# Patient Record
(demographics unavailable — no encounter records)

---

## 2024-10-21 NOTE — HISTORY OF PRESENT ILLNESS
[8] : 8 [0] : 0 [Stabbing] : stabbing [Intermittent] : intermittent [Leisure] : leisure [Sleep] : sleep [Ice] : ice [Retired] : Work status: retired [de-identified] : Right knee pain started worsening in August. No new injury.  Treated with CSI without relief. She also c/o clicking and locking to her right thumb. Treated with 1 CSI in August without relief.  [] : no [FreeTextEntry1] : Right knee [FreeTextEntry9] : Naproxen [de-identified] : getting up and down from a sitting position, crossing her legs [de-identified] : Dr Moreno and Dr Saavedra [de-identified] : 20 years ago [de-identified] : xray none

## 2024-10-21 NOTE — PROCEDURE
[Large Joint Injection] : Large joint injection [Right] : of the right [Knee] : knee [Pain] : pain [Inflammation] : inflammation [X-ray evidence of Osteoarthritis on this or prior visit] : x-ray evidence of Osteoarthritis on this or prior visit [Alcohol] : alcohol [Betadine] : betadine [Ethyl Chloride sprayed topically] : ethyl chloride sprayed topically [Sterile technique used] : sterile technique used [Gel-One (30mg)] : 30mg of Gel-One [] : Patient tolerated procedure well [Apply ice for 15min out of every hour for the next 12-24 hours as tolerated] : apply ice for 15 minutes out of every hour for the next 12-24 hours as tolerated [Previous OTC use and PT nontherapeutic] : patient has tried OTC's including aspirin, Ibuprofen, Aleve, etc or prescription NSAIDS, and/or exercises at home and/or physical therapy without satisfactory response [Patient had decreased mobility in the joint] : patient had decreased mobility in the joint [Risks, benefits, alternatives discussed / Verbal consent obtained] : the risks benefits, and alternatives have been discussed, and verbal consent was obtained [Prior failure or difficult injection] : prior failure or difficult injection [Altered anatomic landmarks d/t erosive arthritis] : altered anatomic landmarks d/t erosive arthritis [All ultrasound images have been permanently captured and stored accordingly in our picture archiving and communication system] : All ultrasound images have been permanently captured and stored accordingly in our picture archiving and communication system

## 2024-10-21 NOTE — DISCUSSION/SUMMARY
[de-identified] : General Dx Discussion The patient was advised of the diagnosis. The natural history of the pathology was explained in full to the patient in layman's terms. All questions were answered. The risks and benefits of surgical and non-surgical treatment alternatives were explained in full to the patient.  Case Discussed. x-rays reviewed. Advised best treatment option is surgery for TKR. She does not wish to have surgery at this time.  Will try Gel One injection. Done today and tolerated well.  Advised to see Dr. Bear/Dr. Barrera for further discussion of right trigger finger release.     Entered by Blanche TSE acting as a scribe. Instructions: Dr. Saavedra- The documentation recorded by the scribe accurately reflects the service I personally performed and the decisions made by me.

## 2024-10-21 NOTE — PHYSICAL EXAM
[A1-Pulley] : A1-pulley [1st] : 1st [Right] : right knee [NL (0)] : extension 0 degrees [5___] : hamstring 5[unfilled]/5 [Negative] : negative Francoise's [] : no pain with varus stress [TWNoteComboBox7] : flexion 110 degrees

## 2024-10-25 NOTE — ASSESSMENT
[FreeTextEntry1] : The patient was advised of the diagnosis. The natural history of the pathology was explained in full to the patient in layman's terms. All questions were answered. The risks and benefits of surgical and non-surgical treatment alternatives were explained in full to the patient.   R thumb tendon sheath injection was performed because of pain and inflammation Anesthesia: ethyl chloride sprayed topically Celestone 6mg: An injection of Celestone 1cc Lidocaine: An injection of Lidocaine 1% 1cc Marcaine: An injection of Marcaine 0.5% 1cc   The risks, benefits, and alternatives to cortisone injection were explained in full to the patient. Risks outlined include but are not limited to infection, sepsis, bleeding, scarring, skin discoloration, temporary increase in pain, syncopal episode, failure to resolve symptoms, allergic reaction, symptom recurrence, and elevation of blood sugar in diabetics. Patient understood the risks. All questions were answered. After discussion of options, patient verbally consented to an injection. Sterile prep was done of the injection site. Patient tolerated the procedure well. Advised to ice the injection site this evening.   Activity modification as tolerated Ice as needed NSAIDs as needed Return prn

## 2024-10-25 NOTE — PHYSICAL EXAM
[de-identified] : R hand:  Mild swelling  Tender 1st A1 pulley  Decreased thumb ROM  +thumb triggering  Xrays min OA

## 2024-10-25 NOTE — HISTORY OF PRESENT ILLNESS
[] : yes [de-identified] : R trigger thumb  Since July   Josh gave injection in Aug  [5] : 5 [3] : 3 [Dull/Aching] : dull/aching [Nothing helps with pain getting better] : Nothing helps with pain getting better [FreeTextEntry1] : R thumb [de-identified] : activity  [de-identified] : Dr. Moreno 8/19 [de-identified] : xr

## 2024-11-03 NOTE — ASSESSMENT
[FreeTextEntry1] : 73 year old female with preceeding viral URI with persistent post nasal drip and throat clearing exacerbated by eating.

## 2024-11-03 NOTE — HISTORY OF PRESENT ILLNESS
[FreeTextEntry8] : The patient is a 73-year-old female past medical history as above who presents for evaluation of increased throat clearing and cough.  She was in her usual state of health until September 2024 when she had an upper respiratory infection that she tested negative for COVID with and she treated with over-the-counter preparations.  She states that her symptom of postnasal drip has continued to worsen since September.  All other symptoms of her URI have improved.  She began to notice increased clearing of her throat approximately 20 to 30 minutes after eating over the past few weeks.  She denies pyrosis or belching as well as fevers any chills.  She states that the symptoms are worse after eating dairy products.

## 2024-11-03 NOTE — PLAN
[FreeTextEntry1] : Otolaryngology persistent PND and throat clearing-likely reminants from recent viral URI, she could have had undiagnosed Covid infection-RX non sedating antihistamine (Claritin, Zyrtec or Allegra) to utilize the drying properties of their anticholinergic properties as well as RX Fluticasone Proprionate NS 2 sprays each nostril QD.  If this intervention does not improve her symptoms will consider trial of Famotadine to treat atypical GERD.

## 2024-11-03 NOTE — PHYSICAL EXAM
[No Acute Distress] : no acute distress [Well Nourished] : well nourished [Well Developed] : well developed [Well-Appearing] : well-appearing [Normal Voice/Communication] : normal voice/communication [Normal Sclera/Conjunctiva] : normal sclera/conjunctiva [PERRL] : pupils equal round and reactive to light [EOMI] : extraocular movements intact [Normal Outer Ear/Nose] : the outer ears and nose were normal in appearance [Normal Nasal Mucosa] : the nasal mucosa was normal [Normal TMs] : both tympanic membranes were normal [No JVD] : no jugular venous distention [No Lymphadenopathy] : no lymphadenopathy [Supple] : supple [Thyroid Normal, No Nodules] : the thyroid was normal and there were no nodules present [No Respiratory Distress] : no respiratory distress  [No Accessory Muscle Use] : no accessory muscle use [Clear to Auscultation] : lungs were clear to auscultation bilaterally [Regular Rhythm] : with a regular rhythm [Normal Rate] : normal rate  [Normal S1, S2] : normal S1 and S2 [No Murmur] : no murmur heard [No Carotid Bruits] : no carotid bruits [No Abdominal Bruit] : a ~M bruit was not heard ~T in the abdomen [No Varicosities] : no varicosities [Pedal Pulses Present] : the pedal pulses are present [No Edema] : there was no peripheral edema [No Palpable Aorta] : no palpable aorta [Declined Breast Exam] : declined breast exam  [Soft] : abdomen soft [Non-distended] : non-distended [Non Tender] : non-tender [No Masses] : no abdominal mass palpated [No HSM] : no HSM [Normal Bowel Sounds] : normal bowel sounds [Declined Rectal Exam] : declined rectal exam [Normal Supraclavicular Nodes] : no supraclavicular lymphadenopathy [Normal Posterior Cervical Nodes] : no posterior cervical lymphadenopathy [Normal Anterior Cervical Nodes] : no anterior cervical lymphadenopathy [No CVA Tenderness] : no CVA  tenderness [Kyphosis] : no kyphosis [No Spinal Tenderness] : no spinal tenderness [Scoliosis] : no scoliosis [No Joint Swelling] : no joint swelling [Grossly Normal Strength/Tone] : grossly normal strength/tone [No Rash] : no rash [Acne] : no acne [Coordination Grossly Intact] : coordination grossly intact [No Focal Deficits] : no focal deficits [Normal Gait] : normal gait [Deep Tendon Reflexes (DTR)] : deep tendon reflexes were 2+ and symmetric [Speech Grossly Normal] : speech grossly normal [Normal Affect] : the affect was normal [Memory Grossly Normal] : memory grossly normal [Alert and Oriented x3] : oriented to person, place, and time [Normal Mood] : the mood was normal [Normal Insight/Judgement] : insight and judgment were intact [de-identified] : cobblestoning of nasopharynx [de-identified] : done by GYN [FreeTextEntry1] : done by [facility] (Dr. Abbott)

## 2024-11-10 NOTE — HISTORY OF PRESENT ILLNESS
[FreeTextEntry1] : follow up post nasal drip fasting blood work [de-identified] : 73 year old female presents for fasting blood work and follow up of recent post nasal drip.  She has started using Claritin 10 mg qd for PND with some improvement in her symptoms.  She notes less throat clearing.  She states that she has taken Sinex on a daily basis for years.  She is taking Atorvastatin 40 mg qd for hyperlipidemia/CAD.  She denies myalgias or arthralgias.

## 2024-11-10 NOTE — PHYSICAL EXAM
[No Acute Distress] : no acute distress [Well Nourished] : well nourished [Well Developed] : well developed [Well-Appearing] : well-appearing [Normal Voice/Communication] : normal voice/communication [Normal Sclera/Conjunctiva] : normal sclera/conjunctiva [PERRL] : pupils equal round and reactive to light [EOMI] : extraocular movements intact [Normal Outer Ear/Nose] : the outer ears and nose were normal in appearance [Normal TMs] : both tympanic membranes were normal [Normal Nasal Mucosa] : the nasal mucosa was normal [No JVD] : no jugular venous distention [No Lymphadenopathy] : no lymphadenopathy [Supple] : supple [Thyroid Normal, No Nodules] : the thyroid was normal and there were no nodules present [No Respiratory Distress] : no respiratory distress  [No Accessory Muscle Use] : no accessory muscle use [Clear to Auscultation] : lungs were clear to auscultation bilaterally [Normal Rate] : normal rate  [Regular Rhythm] : with a regular rhythm [Normal S1, S2] : normal S1 and S2 [No Murmur] : no murmur heard [No Carotid Bruits] : no carotid bruits [No Abdominal Bruit] : a ~M bruit was not heard ~T in the abdomen [No Varicosities] : no varicosities [Pedal Pulses Present] : the pedal pulses are present [No Edema] : there was no peripheral edema [No Palpable Aorta] : no palpable aorta [Declined Breast Exam] : declined breast exam  [Soft] : abdomen soft [Non Tender] : non-tender [Non-distended] : non-distended [No Masses] : no abdominal mass palpated [No HSM] : no HSM [Normal Bowel Sounds] : normal bowel sounds [Declined Rectal Exam] : declined rectal exam [Normal Supraclavicular Nodes] : no supraclavicular lymphadenopathy [Normal Posterior Cervical Nodes] : no posterior cervical lymphadenopathy [No CVA Tenderness] : no CVA  tenderness [Normal Anterior Cervical Nodes] : no anterior cervical lymphadenopathy [No Spinal Tenderness] : no spinal tenderness [Kyphosis] : no kyphosis [Scoliosis] : no scoliosis [No Joint Swelling] : no joint swelling [Grossly Normal Strength/Tone] : grossly normal strength/tone [No Rash] : no rash [Acne] : no acne [Coordination Grossly Intact] : coordination grossly intact [No Focal Deficits] : no focal deficits [Normal Gait] : normal gait [Deep Tendon Reflexes (DTR)] : deep tendon reflexes were 2+ and symmetric [Speech Grossly Normal] : speech grossly normal [Memory Grossly Normal] : memory grossly normal [Normal Affect] : the affect was normal [Alert and Oriented x3] : oriented to person, place, and time [Normal Mood] : the mood was normal [Normal Insight/Judgement] : insight and judgment were intact [de-identified] : cobblestoning posterior nasopharynx [de-identified] : done by GYN [FreeTextEntry1] : done by J[facility] (Dr. Abbott)

## 2024-11-10 NOTE — ASSESSMENT
[FreeTextEntry1] : 73 year old female presents for fasting lipid panel to f/u hyperlipidemia/CAD and follow up of PND and throat clearing.

## 2024-11-10 NOTE — REVIEW OF SYSTEMS
[Postnasal Drip] : postnasal drip [Joint Pain] : no joint pain [Joint Stiffness] : no joint stiffness [Negative] : Heme/Lymph

## 2024-11-10 NOTE — PLAN
[FreeTextEntry1] : Cardiology  HLD/CAD - continue Atorvastatin Calcium 40 MG p.o.q.d. as directed - goal LDL is less than 70 mg/dl, check FLP and LFTs Otolaryngology PND/throat clearing-improving on Fluticasone Proprionate NS and Claritin 10 mg qd, D/C Sinex as likely has some contribution from Rhinitis [facility] will request result of bone density test from GYN  Dr. Holland Musculoskeletal  s/p cervical spinal fusion- continue f/u with orthopedist Dr. Saavedra as necessary Immunization will request record of immunization from [facility] pharmacy in [place] where patient likely had Prevnar vaccine  checking fasting labs -FLP and CMP advised to check lipids and liver enzymes every 6 months

## 2024-12-16 NOTE — HISTORY OF PRESENT ILLNESS
[8] : 8 [0] : 0 [Stabbing] : stabbing [Intermittent] : intermittent [Leisure] : leisure [Sleep] : sleep [Ice] : ice [Retired] : Work status: retired [de-identified] : Patient is here for a follow up of the right knee, states pain has worsened since last visit. No relief with GelOne injection [] : no [FreeTextEntry1] : Right knee [FreeTextEntry9] : Naproxen [de-identified] : getting up and down from a sitting position, crossing her legs [de-identified] : Dr Moreno and Dr Saavedra [de-identified] : 20 years ago [de-identified] : xray

## 2024-12-16 NOTE — DISCUSSION/SUMMARY
[de-identified] : General Dx Discussion The patient was advised of the diagnosis. The natural history of the pathology was explained in full to the patient in layman's terms. All questions were answered. The risks and benefits of surgical and non-surgical treatment alternatives were explained in full to the patient.  No longer getting sufficient relief with conservative measures  Addressed some questions regarding TKA Will refer her to Dr. Ruiz   Entered by Lisa TSE acting as a scribe. Instructions: Dr. Saavedra- The documentation recorded by the scribe accurately reflects the service I personally performed and the decisions made by me.

## 2024-12-16 NOTE — HISTORY OF PRESENT ILLNESS
[8] : 8 [0] : 0 [Stabbing] : stabbing [Intermittent] : intermittent [Leisure] : leisure [Sleep] : sleep [Ice] : ice [Retired] : Work status: retired [de-identified] : Patient is here for a follow up of the right knee, states pain has worsened since last visit. No relief with GelOne injection [] : no [FreeTextEntry1] : Right knee [FreeTextEntry9] : Naproxen [de-identified] : getting up and down from a sitting position, crossing her legs [de-identified] : Dr Moreno and Dr Saavedra [de-identified] : 20 years ago [de-identified] : xray

## 2024-12-16 NOTE — DISCUSSION/SUMMARY
[de-identified] : General Dx Discussion The patient was advised of the diagnosis. The natural history of the pathology was explained in full to the patient in layman's terms. All questions were answered. The risks and benefits of surgical and non-surgical treatment alternatives were explained in full to the patient.  No longer getting sufficient relief with conservative measures  Addressed some questions regarding TKA Will refer her to Dr. Ruiz   Entered by Lisa TSE acting as a scribe. Instructions: Dr. Saavedra- The documentation recorded by the scribe accurately reflects the service I personally performed and the decisions made by me.

## 2025-01-03 NOTE — PHYSICAL EXAM
[Right] : right knee [5___] : hamstring 5[unfilled]/5 [] : no erythema [TWNoteComboBox7] : flexion 125 degrees [de-identified] : extension 0 degrees

## 2025-01-03 NOTE — ASSESSMENT
[FreeTextEntry1] : 73 year F WITH MODERATE RT KNEE PAIN. HAS SEEN DR. VALLE IN THE PAST AND TRIED GEL INJECTIONS AND CSI WITH NO SIGNIFICANT RELIEF. LAST CSI 8/2024. GEL-ONE 10/21/24. PAIN WORSENS WITH STAIRS AND WALKING PROLONGED DISTANCES. PAIN IS AFFECTING ADL AND FUNCTIONAL ACTIVITIES. XRAYS REVIEWED WITH ADVANCED TRICOMPARTMENTAL OA. TREATMENT OPTIONS REVIEWED. QUESTIONS ANSWERED. RT TKA DISCUSSED AT LENGTH.   PMHx: HLD,  NO METAL ALLERGIES NO H/O DM NO H/O DVT/PE NO H/O MRSA/VRSA  RT TKA -   DISCUSSED R&B OF TKA. WILL ORDER CT TO EVAL FOR BONE LOSS AND DEFORMITY FOR PRE-OP PLANNING.   The patient was advised of the diagnosis. The natural history of the pathology was explained in full to the patient in layman's terms. All questions were answered. The risks and benefits of surgical and non-surgical treatment alternatives were explained in full to the patient.   We discussed my findings and the natural history of their condition. We talked about the details of the proposed surgery and the recovery. We discussed the material risks, possible benefits and alternatives to surgery. The risks include but are not limited to infection, bleeding and possible need for blood transfusion, fracture, bowel blockage, bladder retention or infection, need for reoperation, stiffness and/or limited range of motion, possible damage to nerves and blood vessels, failure of fixation of components, risk of deep vein thromboses and pulmonary embolism, wound healing problems. Although incredibly rare, we also discussed the risks of a cardiac event, stroke and even death during, or following, the surgery. We discussed the type of implants the patient will be receiving and the type of fixation that will be used, as well as whether a robot or computer navigation aide will be used. The patient understands they will need medical clearance and will attend a preoperative joint education class. We also discussed the type of anesthesia they will receive, and the risks associated with hospital or rehab length of stay, obesity, diabetes and smoking.   Patient Complains of pain in the affected joint with a level that often reaches greater than a 8/10. The pain has been progressively worsening throughout his/her treatment course. The pain has interfered with their ADLs and worsens with weight bearing. On exam they often have episodes of swelling/effusion with limited ROM. Pain worsens with ROM passive and active and I can palpate crepitus.   X- rays were reviewed with the patient and they show joint space narrowing, subchondral sclerosis, osteophyte formation, and subchondral cysts. After a period of more than 12 weeks physical therapy or exercise program done with me or another treating physician they have continued pain. The patient has failed a trial of NSAID medication or pain relievers if they were unable to tolerate NSAID medications as well as a series of injections, steroids, or hyaluronic acid. After a long discussion with the patient both the patient and I have decided we have exhausted all forms of less radical treatments and they would like to proceed with Total Joint Replacement.   Patient will plan to schedule surgery. Patient requires rolling walker and 3-in-1 commode S/P surgery. Patient currently as limited mobility that significantly impairs their ability to participate in one or more mobility related activities of daily living in the home. The patient is able to safely use the walker and the functional mobility deficit can be sufficiently resolved with the use of a walker. Patient will also be confined to one level of the home environment and there is no toilet on that level. Requires 3-in-1 commode for bedside use as patient cannot access bathroom safely in their home in timely manner. Will order rolling walker and 3-in-1 commode.

## 2025-01-03 NOTE — DISCUSSION/SUMMARY
[de-identified] : I, Hayley Perez, am scribing for Dr. Ruiz in his presence for the chief complaint, physical exam, studies, assessment, and/or plan.

## 2025-01-03 NOTE — HISTORY OF PRESENT ILLNESS
[] : This patient has had an injection before: yes [Gel One] : Gel One [de-identified] : 01/03/25: Nisreen is a 73 year old female here presenting with chronic right knee pain. States Dr. Saavedra has referred her over due to bone on bone, that she is a good candidate for a TKA. States she tore her meniscus 25 years ago. States she has tried CSI with minimal relief and gel shots with no relief. Here to discuss surgery. Last cortisone injection 8/24 with no sig relief.   PMHx HLD   Denies DVT/PE, MRSA/VRSA DENIES METAL ALLERGIES  [de-identified] : 10/24/24 [de-identified] : Right knee  [de-identified] : Gel one 30 mg

## 2025-02-07 NOTE — HISTORY OF PRESENT ILLNESS
[de-identified] : This is Ms. HARLEY EDWARDS  a 73 year old female who comes in today complaining of neck pain. Started 1.5 weeks ago no new numbness or tingling.  Has had prior neck surgery residual numbness from then.

## 2025-02-07 NOTE — PHYSICAL EXAM
[Flexion] : flexion [Extension] : extension [] : light touch intact throughout both upper extremities [TWNoteComboBox7] : forward flexion 30 degrees [de-identified] : extension 30 degrees [de-identified] : left lateral flexion 30 degrees [de-identified] : right lateral flexion 30 degrees

## 2025-02-07 NOTE — DISCUSSION/SUMMARY
[de-identified] : General Dx Discussion The patient was advised of the diagnosis. The natural history of the pathology was explained in full to the patient in layman's terms. All questions were answered. The risks and benefits of surgical and non-surgical treatment alternatives were explained in full to the patient.  xrays same as 2021 will try flexeril and HEP  will see her spine doctor if symptoms cont

## 2025-02-17 NOTE — PLAN
[FreeTextEntry1] : 1. Patient instructed to be NPO after midnight day of procedure  2. Patient instructed to hold all vitamins/NSAIDS/Naproxen/Meloxicam including multivitamin for 1 week prior to procedure.  3. Patient had pre-surgery testing done at [facility] on 2/11/25 prior to today's visit. These results were reviewed with the patient. 4. There is no medical contraindication to the planned procedure and the patient is medically optimized for both surgery and anesthesia. She is therefore "medically cleared" for her right total knee replacement surgery.

## 2025-02-17 NOTE — HISTORY OF PRESENT ILLNESS
[No Pertinent Cardiac History] : no history of aortic stenosis, atrial fibrillation, coronary artery disease, recent myocardial infarction, or implantable device/pacemaker [No Pertinent Pulmonary History] : no history of asthma, COPD, sleep apnea, or smoking [No Adverse Anesthesia Reaction] : no adverse anesthesia reaction in self or family member [Aortic Stenosis] : no aortic stenosis [Atrial Fibrillation] : no atrial fibrillation [Coronary Artery Disease] : coronary artery disease [Recent Myocardial Infarction] : no recent myocardial infarction [Implantable Device/Pacemaker] : no implantable device/pacemaker [Asthma] : no asthma [COPD] : no COPD [Sleep Apnea] : no sleep apnea [Smoker] : not a smoker [Family Member] : no family member with adverse anesthesia reaction/sudden death [Self] : no previous adverse anesthesia reaction [Chronic Anticoagulation] : no chronic anticoagulation [Chronic Kidney Disease] : no chronic kidney disease [Diabetes] : no diabetes [(Patient denies any chest pain, claudication, dyspnea on exertion, orthopnea, palpitations or syncope)] : Patient denies any chest pain, claudication, dyspnea on exertion, orthopnea, palpitations or syncope [Moderate (4-6 METs)] : Moderate (4-6 METs) [FreeTextEntry2] : 2/26/25 [FreeTextEntry1] : Right Total Knee Replacement [FreeTextEntry3] : Dr. Jose Martin Ruiz [FreeTextEntry4] : Patient is a 73-year-old F with a past medical history as below who presents to the office today for medical clearance for a right total knee replacement surgery that will be done on 2/26/25 at [facility]. Pt had pre-surgical testing done at [facility] on 2/11/25. Pt takes all medications as mentioned below without any side effects. Patient has no hx of adverse reactions to anesthesia. Pt has no hx of excessive bleeding/bruising/ chest pain/ palpitations/shortness of breath. CT scan of the heart done 04/23 showed a calcium score of 161.

## 2025-02-17 NOTE — END OF VISIT
[FreeTextEntry3] : "I, Kacey Barrios, personally scribed the services dictated to me by Dr. Reza Alexandra in this documentation on 02/14/2025"   "I, Dr. Reza Alexandra, DO, personally performed the services described in this documentation on 02/14/2025 for the patient as scribed by Kacey Barrios in my presence. I have reviewed and verified that all the information is accurate and true." [Time Spent: ___ minutes] : I have spent [unfilled] minutes of time on the encounter which excludes teaching and separately reported services.

## 2025-02-17 NOTE — ASSESSMENT
[Patient Optimized for Surgery] : Patient optimized for surgery [No Further Testing Recommended] : no further testing recommended [Modify medications prior to procedure] : Modify medications prior to procedure [FreeTextEntry4] : Patient is a 73 year old F with a past medical history as below who presents to the office today for medical clearance for a right total knee replacement surgery.  [FreeTextEntry7] : Stop all vitamins/NSAIDS/Meloxicam/Naproxen 1 week before surgery.

## 2025-02-17 NOTE — RESULTS/DATA
[] : results reviewed [de-identified] : Normal [de-identified] : PTINR, PTT normal [de-identified] : Normal [de-identified] : NSR at 71 BPM

## 2025-02-17 NOTE — HISTORY OF PRESENT ILLNESS
[No Pertinent Cardiac History] : no history of aortic stenosis, atrial fibrillation, coronary artery disease, recent myocardial infarction, or implantable device/pacemaker [No Pertinent Pulmonary History] : no history of asthma, COPD, sleep apnea, or smoking [No Adverse Anesthesia Reaction] : no adverse anesthesia reaction in self or family member [Aortic Stenosis] : no aortic stenosis [Atrial Fibrillation] : no atrial fibrillation [Coronary Artery Disease] : coronary artery disease [Recent Myocardial Infarction] : no recent myocardial infarction [Implantable Device/Pacemaker] : no implantable device/pacemaker [Asthma] : no asthma [COPD] : no COPD [Sleep Apnea] : no sleep apnea [Smoker] : not a smoker [Family Member] : no family member with adverse anesthesia reaction/sudden death [Self] : no previous adverse anesthesia reaction [Chronic Anticoagulation] : no chronic anticoagulation [Chronic Kidney Disease] : no chronic kidney disease [Diabetes] : no diabetes [(Patient denies any chest pain, claudication, dyspnea on exertion, orthopnea, palpitations or syncope)] : Patient denies any chest pain, claudication, dyspnea on exertion, orthopnea, palpitations or syncope [Moderate (4-6 METs)] : Moderate (4-6 METs) [FreeTextEntry1] : Right Total Knee Replacement [FreeTextEntry2] : 2/26/25 [FreeTextEntry3] : Dr. Jose Martin Ruiz [FreeTextEntry4] : Patient is a 73-year-old F with a past medical history as below who presents to the office today for medical clearance for a right total knee replacement surgery that will be done on 2/26/25 at [facility]. Pt had pre-surgical testing done at [facility] on 2/11/25. Pt takes all medications as mentioned below without any side effects. Patient has no hx of adverse reactions to anesthesia. Pt has no hx of excessive bleeding/bruising/ chest pain/ palpitations/shortness of breath. CT scan of the heart done 04/23 showed a calcium score of 161.

## 2025-02-17 NOTE — PLAN
[FreeTextEntry1] : 1. Patient instructed to be NPO after midnight day of procedure  2. Patient instructed to hold all vitamins/NSAIDS/Naproxen/Meloxicam including multivitamin for 1 week prior to procedure.  3. Patient had pre-surgery testing done at [facility] on 2/11/25 prior to today's visit. These results were reviewed with the patient. 4. There is no medical contraindication to the planned procedure and the patient is medically optimized for both surgery and anesthesia. She is therefore "medically cleared" for her right total knee replacement surgery.

## 2025-02-17 NOTE — RESULTS/DATA
[] : results reviewed [de-identified] : Normal [de-identified] : PTINR, PTT normal [de-identified] : Normal [de-identified] : NSR at 71 BPM

## 2025-02-17 NOTE — PHYSICAL EXAM
[No Acute Distress] : no acute distress [Well Nourished] : well nourished [Well Developed] : well developed [Well-Appearing] : well-appearing [Normal Voice/Communication] : normal voice/communication [Normal Sclera/Conjunctiva] : normal sclera/conjunctiva [PERRL] : pupils equal round and reactive to light [EOMI] : extraocular movements intact [Fundoscopic Exam Performed] : fundoscopic ~T exam ~C was performed [Normal Outer Ear/Nose] : the outer ears and nose were normal in appearance [Normal Oropharynx] : the oropharynx was normal [Normal TMs] : both tympanic membranes were normal [Normal Nasal Mucosa] : the nasal mucosa was normal [No JVD] : no jugular venous distention [No Lymphadenopathy] : no lymphadenopathy [Supple] : supple [Thyroid Normal, No Nodules] : the thyroid was normal and there were no nodules present [No Respiratory Distress] : no respiratory distress  [No Accessory Muscle Use] : no accessory muscle use [Clear to Auscultation] : lungs were clear to auscultation bilaterally [Normal Percussion] : the chest was normal to percussion [Normal Rate] : normal rate  [Regular Rhythm] : with a regular rhythm [Normal S1, S2] : normal S1 and S2 [No Murmur] : no murmur heard [No Carotid Bruits] : no carotid bruits [No Abdominal Bruit] : a ~M bruit was not heard ~T in the abdomen [No Varicosities] : no varicosities [Pedal Pulses Present] : the pedal pulses are present [No Edema] : there was no peripheral edema [No Palpable Aorta] : no palpable aorta [No Extremity Clubbing/Cyanosis] : no extremity clubbing/cyanosis [Soft] : abdomen soft [Non Tender] : non-tender [Non-distended] : non-distended [No Masses] : no abdominal mass palpated [No HSM] : no HSM [Normal Bowel Sounds] : normal bowel sounds [Normal Supraclavicular Nodes] : no supraclavicular lymphadenopathy [Normal Axillary Nodes] : no axillary lymphadenopathy [Normal Posterior Cervical Nodes] : no posterior cervical lymphadenopathy [Normal Anterior Cervical Nodes] : no anterior cervical lymphadenopathy [No CVA Tenderness] : no CVA  tenderness [No Spinal Tenderness] : no spinal tenderness [No Joint Swelling] : no joint swelling [Grossly Normal Strength/Tone] : grossly normal strength/tone [No Rash] : no rash [Coordination Grossly Intact] : coordination grossly intact [No Focal Deficits] : no focal deficits [Normal Gait] : normal gait [Deep Tendon Reflexes (DTR)] : deep tendon reflexes were 2+ and symmetric [Speech Grossly Normal] : speech grossly normal [Memory Grossly Normal] : memory grossly normal [Normal Affect] : the affect was normal [Alert and Oriented x3] : oriented to person, place, and time [Normal Mood] : the mood was normal [Normal Insight/Judgement] : insight and judgment were intact [Kyphosis] : no kyphosis [Scoliosis] : no scoliosis [Acne] : no acne

## 2025-03-11 NOTE — PHYSICAL EXAM
[Right] : right knee [] : no wound erythema [de-identified] : WAS NOT ASSESSED.  [TWNoteComboBox7] : flexion 100 degrees [de-identified] : extension 0 degrees

## 2025-03-11 NOTE — ASSESSMENT
[FreeTextEntry1] : S/P RT TKA 2/26/25: NO F/C/S. DOING WELL. XRAYS REVIEWED WITH COMPONENTS WELL FIXED. NO SIGNS OF INFECTION. GOOD LIGAMENT BALANCE ON EXAM.  DISCUSSED THE IMPORTANCE OF ELEVATION TO REDUCE SWELLING. PROPER ELEVATION TECHNIQUES DISCUSSED. ABX AND PRECAUTIONS REVIEWED. PT RX. QUESTIONS ANSWERED.   WILL ORDER STAT VENOUS DOPPLER OF LE DUE TO SWELLING AND CALF TENDERNESS POST OP, R/O DVT. PATIENT HAS LOWER EXTREMITY SWELLING. I AM CONCERNED FOR DVT. WILL SEND PATIENT FOR EMERGENT ULTRASOUND FOR DIAGNOSIS. PATIENT UNDERSTANDS THEY WILL PROCEED TO EMERGENCY ROOM IF POSITIVE. PATIENT UNDERSTANDS DVT AND PE CAN BE LIFE THREATENING.

## 2025-03-11 NOTE — IMAGING
[Right] : right knee [AP] : anteroposterior [Lateral] : lateral [[place]] : skyline [Components well fixed, in good position] : Components well fixed, in good position

## 2025-03-11 NOTE — HISTORY OF PRESENT ILLNESS
[5] : 5 [Sleep] : sleep [] : This patient has had an injection before: yes [Gel One] : Gel One [de-identified] : 3.11.25 PATIENT HERE FOR RIGHT KNEE. DOS: 2.26.25 [de-identified] : 10/24/24 [de-identified] : Right knee  [de-identified] : Gel one 30 mg  Attending

## 2025-04-11 NOTE — ASSESSMENT
[FreeTextEntry1] : S/P RT TKA 2/26/25: NO F/C/S. DOING WELL. XRAYS REVIEWED WITH COMPONENTS WELL FIXED. NO SIGNS OF INFECTION. GOOD LIGAMENT BALANCE ON EXAM. WE AGAIN DISCUSSED ABX PPX FOR DENTAL PROCEDURES FOR AT LEAST 2 YEARS FROM SURGERY. PT RX. QUESTIONS ANSWERED.

## 2025-04-11 NOTE — HISTORY OF PRESENT ILLNESS
[5] : 5 [Sleep] : sleep [] : This patient has had an injection before: yes [Gel One] : Gel One [de-identified] : 3.11.25 PATIENT HERE FOR RIGHT KNEE. DOS: 2.26.25  4/11/25: Patient here for post up #2 right knee.  [de-identified] : 10/24/24 [de-identified] : Right knee  [de-identified] : Gel one 30 mg

## 2025-04-11 NOTE — DISCUSSION/SUMMARY
[de-identified] : I, Hayley Perez, am scribing for Dr. Ruiz in his presence for the chief complaint, physical exam, studies, assessment, and/or plan.

## 2025-04-11 NOTE — PHYSICAL EXAM
[Right] : right knee [] : no wound erythema [de-identified] : WAS NOT ASSESSED.  [TWNoteComboBox7] : flexion 125 degrees [de-identified] : extension 0 degrees

## 2025-04-11 NOTE — IMAGING
[Right] : right knee [AP] : anteroposterior [Lateral] : lateral [[place]] : skyline [Components well fixed, in good position] : Components well fixed, in good position

## 2025-05-15 NOTE — HEALTH RISK ASSESSMENT
[Good] : ~his/her~  mood as  good [Yes] : Yes [Monthly or less (1 pt)] : Monthly or less (1 point) [1 or 2 (0 pts)] : 1 or 2 (0 points) [Never (0 pts)] : Never (0 points) [No] : In the past 12 months have you used drugs other than those required for medical reasons? No [No falls in past year] : Patient reported no falls in the past year [0] : 2) Feeling down, depressed, or hopeless: Not at all (0) [PHQ-2 Negative - No further assessment needed] : PHQ-2 Negative - No further assessment needed [PHQ-9 Negative - No further assessment needed] : PHQ-9 Negative - No further assessment needed [Never] : Never [Patient reported mammogram was normal] : Patient reported mammogram was normal [Audit-CScore] : 1 [[id number]] : 0 [MammogramDate] : 01/23 [MammogramComments] : no mammographic or ultrasonographic evidence of malignancy; BI-RADS category 2: benign [ColonoscopyDate] : 6/21 [ColonoscopyComments] : polyp sigmoid colon, internal hemorrhoid

## 2025-05-15 NOTE — END OF VISIT
[FreeTextEntry3] : I, Betty Tee, acted solely as scribe for Dr. Reza Alexandra DO on this date 05/15/2025.   All medical record entries made by the Scribe were at my, Dr. Reza Alexandra DO direction and personally dictated by me on 05/15/2025, I have reviewed the chart and agree that the record accurately reflects my personal performance of the history, physical exam, assessment and plan. I have also personally directed, reviewed and agreed with the chart.     [Time Spent: ___ minutes] : I have spent [unfilled] minutes of time on the encounter which excludes teaching and separately reported services.

## 2025-05-15 NOTE — HEALTH RISK ASSESSMENT
[Good] : ~his/her~  mood as  good [Yes] : Yes [Monthly or less (1 pt)] : Monthly or less (1 point) [1 or 2 (0 pts)] : 1 or 2 (0 points) [Never (0 pts)] : Never (0 points) [No] : In the past 12 months have you used drugs other than those required for medical reasons? No [No falls in past year] : Patient reported no falls in the past year [0] : 2) Feeling down, depressed, or hopeless: Not at all (0) [PHQ-2 Negative - No further assessment needed] : PHQ-2 Negative - No further assessment needed [PHQ-9 Negative - No further assessment needed] : PHQ-9 Negative - No further assessment needed [Never] : Never [Patient reported mammogram was normal] : Patient reported mammogram was normal [Audit-CScore] : 1 [[id number]] : 0 [MammogramDate] : 01/23 [MammogramComments] : no mammographic or ultrasonographic evidence of malignancy; BI-RADS category 2: benign [ColonoscopyDate] : 6/21 [ColonoscopyComments] : polyp sigmoid colon, internal hemorrhoid

## 2025-05-15 NOTE — PHYSICAL EXAM
[No Acute Distress] : no acute distress [Well Nourished] : well nourished [Well Developed] : well developed [Well-Appearing] : well-appearing [Normal Voice/Communication] : normal voice/communication [Normal Sclera/Conjunctiva] : normal sclera/conjunctiva [PERRL] : pupils equal round and reactive to light [EOMI] : extraocular movements intact [Normal Outer Ear/Nose] : the outer ears and nose were normal in appearance [Normal Oropharynx] : the oropharynx was normal [Normal TMs] : both tympanic membranes were normal [Normal Nasal Mucosa] : the nasal mucosa was normal [No JVD] : no jugular venous distention [No Lymphadenopathy] : no lymphadenopathy [Supple] : supple [Thyroid Normal, No Nodules] : the thyroid was normal and there were no nodules present [No Respiratory Distress] : no respiratory distress  [No Accessory Muscle Use] : no accessory muscle use [Clear to Auscultation] : lungs were clear to auscultation bilaterally [Normal Rate] : normal rate  [Regular Rhythm] : with a regular rhythm [Normal S1, S2] : normal S1 and S2 [No Murmur] : no murmur heard [No Carotid Bruits] : no carotid bruits [No Abdominal Bruit] : a ~M bruit was not heard ~T in the abdomen [No Varicosities] : no varicosities [Pedal Pulses Present] : the pedal pulses are present [No Edema] : there was no peripheral edema [No Palpable Aorta] : no palpable aorta [Declined Breast Exam] : declined breast exam  [Soft] : abdomen soft [Non Tender] : non-tender [Non-distended] : non-distended [No Masses] : no abdominal mass palpated [No HSM] : no HSM [Normal Bowel Sounds] : normal bowel sounds [Declined Rectal Exam] : declined rectal exam [Normal Supraclavicular Nodes] : no supraclavicular lymphadenopathy [Normal Posterior Cervical Nodes] : no posterior cervical lymphadenopathy [Normal Anterior Cervical Nodes] : no anterior cervical lymphadenopathy [No CVA Tenderness] : no CVA  tenderness [No Spinal Tenderness] : no spinal tenderness [No Joint Swelling] : no joint swelling [Grossly Normal Strength/Tone] : grossly normal strength/tone [No Rash] : no rash [Coordination Grossly Intact] : coordination grossly intact [No Focal Deficits] : no focal deficits [Normal Gait] : normal gait [Deep Tendon Reflexes (DTR)] : deep tendon reflexes were 2+ and symmetric [Speech Grossly Normal] : speech grossly normal [Memory Grossly Normal] : memory grossly normal [Normal Affect] : the affect was normal [Alert and Oriented x3] : oriented to person, place, and time [Normal Mood] : the mood was normal [Normal Insight/Judgement] : insight and judgment were intact [Kyphosis] : no kyphosis [Scoliosis] : no scoliosis [Acne] : no acne [de-identified] : done by GYN [FreeTextEntry1] : done by JAMES (Dr. Abbott)

## 2025-05-15 NOTE — PHYSICAL EXAM
[No Acute Distress] : no acute distress [Well Nourished] : well nourished [Well Developed] : well developed [Well-Appearing] : well-appearing [Normal Voice/Communication] : normal voice/communication [Normal Sclera/Conjunctiva] : normal sclera/conjunctiva [PERRL] : pupils equal round and reactive to light [EOMI] : extraocular movements intact [Normal Outer Ear/Nose] : the outer ears and nose were normal in appearance [Normal Oropharynx] : the oropharynx was normal [Normal TMs] : both tympanic membranes were normal [Normal Nasal Mucosa] : the nasal mucosa was normal [No JVD] : no jugular venous distention [No Lymphadenopathy] : no lymphadenopathy [Supple] : supple [Thyroid Normal, No Nodules] : the thyroid was normal and there were no nodules present [No Respiratory Distress] : no respiratory distress  [No Accessory Muscle Use] : no accessory muscle use [Clear to Auscultation] : lungs were clear to auscultation bilaterally [Normal Rate] : normal rate  [Regular Rhythm] : with a regular rhythm [Normal S1, S2] : normal S1 and S2 [No Murmur] : no murmur heard [No Carotid Bruits] : no carotid bruits [No Abdominal Bruit] : a ~M bruit was not heard ~T in the abdomen [No Varicosities] : no varicosities [Pedal Pulses Present] : the pedal pulses are present [No Edema] : there was no peripheral edema [No Palpable Aorta] : no palpable aorta [Declined Breast Exam] : declined breast exam  [Soft] : abdomen soft [Non Tender] : non-tender [Non-distended] : non-distended [No Masses] : no abdominal mass palpated [No HSM] : no HSM [Normal Bowel Sounds] : normal bowel sounds [Declined Rectal Exam] : declined rectal exam [Normal Supraclavicular Nodes] : no supraclavicular lymphadenopathy [Normal Posterior Cervical Nodes] : no posterior cervical lymphadenopathy [Normal Anterior Cervical Nodes] : no anterior cervical lymphadenopathy [No CVA Tenderness] : no CVA  tenderness [No Spinal Tenderness] : no spinal tenderness [No Joint Swelling] : no joint swelling [Grossly Normal Strength/Tone] : grossly normal strength/tone [No Rash] : no rash [Coordination Grossly Intact] : coordination grossly intact [No Focal Deficits] : no focal deficits [Normal Gait] : normal gait [Deep Tendon Reflexes (DTR)] : deep tendon reflexes were 2+ and symmetric [Speech Grossly Normal] : speech grossly normal [Memory Grossly Normal] : memory grossly normal [Normal Affect] : the affect was normal [Alert and Oriented x3] : oriented to person, place, and time [Normal Mood] : the mood was normal [Normal Insight/Judgement] : insight and judgment were intact [Kyphosis] : no kyphosis [Scoliosis] : no scoliosis [Acne] : no acne [de-identified] : done by GYN [FreeTextEntry1] : done by JAMES (Dr. Abbott)

## 2025-05-15 NOTE — HISTORY OF PRESENT ILLNESS
[FreeTextEntry1] : annual wellness visit  [de-identified] : HARLEY EDWARDS is a 74-year-old female w/ a PMHx as stated below, presenting in office for annual wellness exam.  She is currently taking Atorvastatin Calcium 40 MG and Restasis 0.05% Opthalmic emulsion as prescribed with no complications. Patient has had total right knee replacement in February of 2025 and recuperating well since then. She takes Meloxicam 15mg prn for the pain and it helps.   Age-Appropriate Screenings The patient's last mammogram was 1/2025 at [facility], [facility]. The patient's last GYN annual w/ Dr. Holland was 1/2025. She reports everything was nl. She last completed a colonoscopy in 6/2021 w/ Dr. Abbott. Her most recent DEXA was 1/2024 and it was acceptable per GYN.   Vaccinations The patient is up to date on TDAP 8/2018. She has received the shingles vaccine 8/2018. The patient has 2 doses of the pneumonia vaccine. She has completed 7 COVID vaccines and received the [id number] flu vaccine. She has not received RSV vaccine as of now.   The patient walks for exercises a few times a week. She believes her diet is adequate.

## 2025-05-15 NOTE — PLAN
[FreeTextEntry1] : Cardiology  HLD - continue Atorvastatin Calcium 40 MG p.o.q.d. as directed - recommended to maintain a low-fat diet. Gynecology will request result of bone density test from GYN Dr. Holland Musculoskeletal  s/p cervical spinal fusion- continue f/u with orthopedist Dr. Saavedra as necessary s/p right total knee replacement- continue Meloxicam 15mg p.o prn as advised; continue PT Immunization will request record of immunization from [facility] pharmacy in [place] where patient likely had Prevnar vaccine  Blood work collected at the office today checking fasting labs - A1C, TSH, CBC, CMP, FLP, Vit D, liver enzymes advised to check lipids and liver enzymes every 6 months

## 2025-05-15 NOTE — HISTORY OF PRESENT ILLNESS
[FreeTextEntry1] : annual wellness visit  [de-identified] : HARLEY EDWARDS is a 74-year-old female w/ a PMHx as stated below, presenting in office for annual wellness exam.  She is currently taking Atorvastatin Calcium 40 MG and Restasis 0.05% Opthalmic emulsion as prescribed with no complications. Patient has had total right knee replacement in February of 2025 and recuperating well since then. She takes Meloxicam 15mg prn for the pain and it helps.   Age-Appropriate Screenings The patient's last mammogram was 1/2025 at [facility], [facility]. The patient's last GYN annual w/ Dr. Holland was 1/2025. She reports everything was nl. She last completed a colonoscopy in 6/2021 w/ Dr. Abbott. Her most recent DEXA was 1/2024 and it was acceptable per GYN.   Vaccinations The patient is up to date on TDAP 8/2018. She has received the shingles vaccine 8/2018. The patient has 2 doses of the pneumonia vaccine. She has completed 7 COVID vaccines and received the [id number] flu vaccine. She has not received RSV vaccine as of now.   The patient walks for exercises a few times a week. She believes her diet is adequate.

## 2025-05-15 NOTE — ASSESSMENT
[FreeTextEntry1] : HARLEY EDWARDS is a 74-year-old female w/ a PMHx as stated above, presenting in office for annual wellness visit. [Vaccines Reviewed] : Immunizations reviewed today. Please see immunization details in the vaccine log within the immunization flowsheet.

## 2025-05-15 NOTE — PLAN
[FreeTextEntry1] : Cardiology  HLD - continue Atorvastatin Calcium 40 MG p.o.q.d. as directed - recommended to maintain a low-fat diet. Gynecology will request result of bone density test from GYN Dr. Holland Musculoskeletal  s/p cervical spinal fusion- continue f/u with orthopedist Dr. Saavedra as necessary s/p right total knee replacement- continue Meloxicam 15mg p.o prn as advised; continue PT Immunization will request record of immunization from [facility] pharmacy in [place] where patient likely had Prevnar vaccine  Blood work collected at the office today checking fasting labs - A1C, TSH, CBC, CMP, FLP, Vit D, liver enzymes advised to check lipids and liver enzymes every 6 months

## 2025-05-30 NOTE — IMAGING
[Right] : right knee [AP] : anteroposterior [Lateral] : lateral [[place]] : skyline [Components well fixed, in good position] : Components well fixed, in good position [FreeTextEntry9] : 5/30/25: xrays stable today

## 2025-05-30 NOTE — HISTORY OF PRESENT ILLNESS
[5] : 5 [Sleep] : sleep [] : This patient has had an injection before: yes [Gel One] : Gel One [de-identified] : 3.11.25 PATIENT HERE FOR RIGHT KNEE. DOS: 2.26.25 4/11/25: Patient here for post up #2 right knee.  5/30/25: Here for a follow up on the right knee. DOS: 2/26/25. states recent pain in back of knee with buckling at times., no swelling, no f/c/s. mostly with walking no paresteshias, no back pain  [de-identified] : 10/24/24 [de-identified] : Right knee  [de-identified] : Gel one 30 mg

## 2025-05-30 NOTE — ASSESSMENT
Routine PP care   [FreeTextEntry1] : S/P RT TKA 2/26/25: NO F/C/S. DOING WELL. XRAYS REVIEWED WITH COMPONENTS WELL FIXED. NO SIGNS OF INFECTION. GOOD LIGAMENT BALANCE ON EXAM. WE AGAIN DISCUSSED ABX PPX FOR DENTAL PROCEDURES FOR AT LEAST 2 YEARS FROM SURGERY. PT RX. QUESTIONS ANSWERED.   5/30/25: XRAYS TODAY WITH WELL FIXED COMPONENTS. EXAM WITHOUT SWELLING OR EFFUSION. LIGAMENTS STABLE. GOOD ROM WITH FLEXION . NO SIGNS OF INFECTIONS. VERY TIGHT RIGHT HAMSTRING. THIS IS LIKELY A LUMBAR RADIC WITH TIGHT HAMSTRING. WILL ADD TO PT SCRIPT.  MEDROL DOSE PACK SENT WITH CAUTION TO USE AND SIDE EFFECTS FU IN 4 WEEKS IF PERISTS WILL ALSO CONSIDER LEFT KNEE CORTISONE AT THIS TIME

## 2025-05-30 NOTE — PHYSICAL EXAM
[Right] : right knee [Flexion] : flexion [Extension] : extension [4___] : quadriceps 4[unfilled]/5 [] : no wound erythema [TWNoteComboBox7] : flexion 125 degrees [de-identified] : extension 0 degrees

## 2025-05-30 NOTE — DISCUSSION/SUMMARY
[de-identified] :   Dr. Ruiz was the supervising physician on site and available to me, Naida LUCAS, for this visit. My services to this patient are as a follow up to Dr Moore plan of care.

## 2025-06-13 NOTE — HISTORY OF PRESENT ILLNESS
[5] : 5 [Sleep] : sleep [] : This patient has had an injection before: yes [Gel One] : Gel One [de-identified] : 3.11.25 PATIENT HERE FOR RIGHT KNEE. DOS: 2.26.25 4/11/25: Patient here for post up #2 right knee.  5/30/25: Here for a follow up on the right knee. DOS: 2/26/25. states recent pain in back of knee with buckling at times., no swelling, no f/c/s. mostly with walking no paresteshias, no back pain  6.13.25 PATIENT HERE FOR RIGHT KNEE. DOS: 2.26.25 [de-identified] : 10/24/24 [de-identified] : Right knee  [de-identified] : Gel one 30 mg

## 2025-06-13 NOTE — PHYSICAL EXAM
[Flexion] : flexion [Extension] : extension [Right] : right knee [4___] : quadriceps 4[unfilled]/5 [] : no wound erythema [TWNoteComboBox7] : flexion 125 degrees [de-identified] : extension 0 degrees

## 2025-06-13 NOTE — IMAGING
[Right] : right knee [AP] : anteroposterior [Lateral] : lateral [[place]] : skyline [Components well fixed, in good position] : Components well fixed, in good position [FreeTextEntry9] : 5/30/25: xrays stable today

## 2025-06-13 NOTE — DISCUSSION/SUMMARY
[de-identified] :   Dr. Ruiz was the supervising physician on site and available to me, Naida LUCAS, for this visit. My services to this patient are as a follow up to Dr Moore plan of care.

## 2025-06-13 NOTE — ASSESSMENT
[FreeTextEntry1] : S/P RT TKA 2/26/25: NO F/C/S. DOING WELL. XRAYS REVIEWED WITH COMPONENTS WELL FIXED. NO SIGNS OF INFECTION. GOOD LIGAMENT BALANCE ON EXAM. WE AGAIN DISCUSSED ABX PPX FOR DENTAL PROCEDURES FOR AT LEAST 2 YEARS FROM SURGERY. PT RX. QUESTIONS ANSWERED.   KEFLEX 500MG IS PRESCRIBED TO TAKE 4 PILLS ONE HOUR PRIOR TO DENTAL PROCEDURE.  74 year F WITH MODERATE LT KNEE OA. PAIN WORSENS WITH STAIRS AND WALKING PROLONGED DISTANCES. PAIN IS AFFECTING ADL AND FUNCTIONAL ACTIVITIES. WILL FOLLOW UP IN 3 WEEKS FOR LT KNEE CSI AND XRAY.

## 2025-07-08 NOTE — HISTORY OF PRESENT ILLNESS
[5] : 5 [Sleep] : sleep [] : This patient has had an injection before: yes [Gel One] : Gel One [Tightness] : tightness [de-identified] : 3.11.25 PATIENT HERE FOR RIGHT KNEE. DOS: 2.26.25 4/11/25: Patient here for post up #2 right knee.  5/30/25: Here for a follow up on the right knee. DOS: 2/26/25. states recent pain in back of knee with buckling at times., no swelling, no f/c/s. mostly with walking no paresteshias, no back pain  6.13.25 PATIENT HERE FOR RIGHT KNEE. DOS: 2.26.25  7.8.25 PATIENT IS HERE FOR BILATERAL KNE PAIN. PATIENT IS REQUESTING CORTISONE INJECTION ON THE LEFT KNEE. PATIENT STATES SHE HAS PAIN BEHIND THE RIGHT KNEE.  [de-identified] : 10/24/24 [de-identified] : Right knee  [de-identified] : Gel one 30 mg

## 2025-07-08 NOTE — PROCEDURE
[de-identified] : Procedure Name: Large Joint Injection / Aspiration: Depomedrol and Marcaine Anesthesia: ethyl chloride sprayed topically..  Depomedrol: An injection of Depomedrol 80 mg , 1 cc.  Marcaine: 5 cc.  Medication was injected in the left knee. Patient has tried OTC's including aspirin, Ibuprofen, Aleve etc or prescription NSAIDS, and/or exercises at home and/ or physical therapy without satisfactory response. After verbal consent using sterile preparation and technique. The risks, benefits, and alternatives to cortisone injection were explained in full to the patient. Risks outlined include but are not limited to infection, sepsis, bleeding, scarring, skin discoloration, temporary  increase in pain, syncopal episode, failure to resolve symptoms, allergic reaction, symptom recurrence, and elevation of blood sugar in diabetics. Patient understood the risks. All questions were answered. After discussion of options, patient requested an injection. Oral informed consent was obtained and sterile prep was done of the injection site. Sterile technique was utilized for the procedure including the preparation of the solutions used for the injection. Patient tolerated the procedure well. Advised to ice the injection site this evening. Prep with alcohol locally to site. Sterile technique used. Post Procedure Instructions: Patient was advised to call if redness, pain, or fever occur and apply ice for 15 min. out of every hour for the next 12-24 hours as tolerated.

## 2025-07-08 NOTE — ASSESSMENT
[FreeTextEntry1] : S/P RT TKA 2/26/25: NO F/C/S. DOING WELL. XRAYS REVIEWED WITH COMPONENTS WELL FIXED. NO SIGNS OF INFECTION. GOOD LIGAMENT BALANCE ON EXAM. WE AGAIN DISCUSSED ABX PPX FOR DENTAL PROCEDURES FOR AT LEAST 2 YEARS FROM SURGERY. PT RX. QUESTIONS ANSWERED.   74 year F WITH MODERATE LT KNEE OA. PAIN WORSENS WITH STAIRS AND WALKING PROLONGED DISTANCES. PAIN IS AFFECTING ADL AND FUNCTIONAL ACTIVITIES. QUESTIONS ANSWERED.   LT KNEE CSI TODAY. PATIENT TOLERATED INJECTION WELL.

## 2025-07-08 NOTE — PHYSICAL EXAM
[Flexion] : flexion [Extension] : extension [Right] : right knee [4___] : quadriceps 4[unfilled]/5 [] : no wound erythema [TWNoteComboBox7] : flexion 125 degrees [de-identified] : extension 0 degrees

## 2025-07-08 NOTE — IMAGING
[Right] : right knee [AP] : anteroposterior [Lateral] : lateral [Leander] : skyline [Components well fixed, in good position] : Components well fixed, in good position [FreeTextEntry9] : 5/30/25: xrays stable today